# Patient Record
Sex: FEMALE | Race: BLACK OR AFRICAN AMERICAN | Employment: UNEMPLOYED
[De-identification: names, ages, dates, MRNs, and addresses within clinical notes are randomized per-mention and may not be internally consistent; named-entity substitution may affect disease eponyms.]

---

## 2017-06-03 ENCOUNTER — HEALTH MAINTENANCE LETTER (OUTPATIENT)
Age: 22
End: 2017-06-03

## 2017-07-24 ENCOUNTER — OFFICE VISIT (OUTPATIENT)
Dept: URGENT CARE | Facility: URGENT CARE | Age: 22
End: 2017-07-24
Payer: OTHER GOVERNMENT

## 2017-07-24 VITALS
HEART RATE: 62 BPM | OXYGEN SATURATION: 98 % | DIASTOLIC BLOOD PRESSURE: 80 MMHG | TEMPERATURE: 97.7 F | RESPIRATION RATE: 18 BRPM | BODY MASS INDEX: 20.93 KG/M2 | SYSTOLIC BLOOD PRESSURE: 122 MMHG | WEIGHT: 145.9 LBS

## 2017-07-24 DIAGNOSIS — J01.90 ACUTE SINUSITIS WITH SYMPTOMS > 10 DAYS: ICD-10-CM

## 2017-07-24 DIAGNOSIS — J20.9 ACUTE BRONCHITIS WITH SYMPTOMS > 10 DAYS: Primary | ICD-10-CM

## 2017-07-24 PROCEDURE — 99214 OFFICE O/P EST MOD 30 MIN: CPT | Performed by: PHYSICIAN ASSISTANT

## 2017-07-24 RX ORDER — PREDNISONE 20 MG/1
40 TABLET ORAL DAILY
Qty: 12 TABLET | Refills: 0 | Status: SHIPPED | OUTPATIENT
Start: 2017-07-24 | End: 2017-07-30

## 2017-07-24 RX ORDER — ALBUTEROL SULFATE 90 UG/1
2 AEROSOL, METERED RESPIRATORY (INHALATION) EVERY 4 HOURS PRN
Qty: 1 INHALER | Refills: 1 | Status: SHIPPED | OUTPATIENT
Start: 2017-07-24 | End: 2021-04-06

## 2017-07-24 NOTE — NURSING NOTE
"Chief Complaint   Patient presents with     Urgent Care     Nasal Congestion     Pt states x 2 weeks has had chest congestion, nose congestion, and productive cough.        Initial /80 (BP Location: Right arm, Patient Position: Chair, Cuff Size: Adult Regular)  Pulse 62  Temp 97.7  F (36.5  C) (Tympanic)  Resp 18  Wt 145 lb 14.4 oz (66.2 kg)  SpO2 98%  Breastfeeding? Yes  BMI 20.93 kg/m2 Estimated body mass index is 20.93 kg/(m^2) as calculated from the following:    Height as of 9/12/16: 5' 10\" (1.778 m).    Weight as of this encounter: 145 lb 14.4 oz (66.2 kg).  Medication Reconciliation: unable or not appropriate to perform   Nely Shetty CMA ([facility]) 7/24/2017 9:07 AM    "

## 2017-07-24 NOTE — MR AVS SNAPSHOT
After Visit Summary   7/24/2017    Lily Glez    MRN: [id number]           Patient Information     Date Of Birth          1995        Visit Information        Provider Department      7/24/2017 9:05 AM Brittaney Alvarenga PA-C [facility]        Today's Diagnoses     Acute bronchitis with symptoms > 10 days    -  1    Acute sinusitis with symptoms > 10 days           Follow-ups after your visit        Your next 10 appointments already scheduled     Jul 31, 2017  8:50 AM CDT   [facility]    [address] [place] [address]  [address]  [facility] [id number] [phone]           Bring a current list of meds and any records pertaining to this visit.  For Physicals, please bring immunization records and any forms needing to be filled out.  Please arrive 10 minutes early to complete paperwork.              Who to contact     If you have questions or need follow up information about today's clinic visit or your schedule please contact [facility] directly at [phone].  Normal or non-critical lab and imaging results will be communicated to you by Brand Embassyhart, letter or phone within 4 business days after the clinic has received the results. If you do not hear from us within 7 days, please contact the clinic through [employer] or phone. If you have a critical or abnormal lab result, we will notify you by phone as soon as possible.  Submit refill requests through [employer] or call your pharmacy and they will forward the refill request to us. Please allow 3 business days for your refill to be completed.          Additional Information About Your Visit        MyChart Information     [employer] gives you secure access to your electronic health record. If you see a primary care provider, you can also send messages to your care team and make appointments. If you have questions, please call your primary care  clinic.  If you do not have a primary care provider, please call [phone] and they will assist you.        Care EveryWhere ID     This is your Care EveryWhere ID. This could be used by other organizations to access your [place] medical records  [id number]        Your Vitals Were     Pulse Temperature Respirations Pulse Oximetry Breastfeeding? BMI (Body Mass Index)    62 97.7  F (36.5  C) (Tympanic) 18 98% Yes 20.93 kg/m2       Blood Pressure from Last 3 Encounters:   07/24/17 122/80   12/30/16 124/75   09/12/16 106/60    Weight from Last 3 Encounters:   07/24/17 145 lb 14.4 oz (66.2 kg)   09/12/16 142 lb (64.4 kg)   05/04/16 133 lb 6.4 oz (60.5 kg)              Today, you had the following     No orders found for display         Today's Medication Changes          These changes are accurate as of: 7/24/17  9:35 AM.  If you have any questions, ask your nurse or doctor.               Start taking these medicines.        Dose/Directions    albuterol 108 (90 BASE) MCG/ACT Inhaler   Commonly known as:  PROAIR HFA/PROVENTIL HFA/VENTOLIN HFA   Used for:  Acute bronchitis with symptoms > 10 days   Started by:  Brittaney Alvarenga PA-C        Dose:  2 puff   Inhale 2 puffs into the lungs every 4 hours as needed for shortness of breath / dyspnea or wheezing   Quantity:  1 Inhaler   Refills:  1       amoxicillin-clavulanate 875-125 MG per tablet   Commonly known as:  AUGMENTIN   Used for:  Acute bronchitis with symptoms > 10 days, Acute sinusitis with symptoms > 10 days   Started by:  Brittaney Alvarenga PA-C        Dose:  1 tablet   Take 1 tablet by mouth 2 times daily   Quantity:  20 tablet   Refills:  0       predniSONE 20 MG tablet   Commonly known as:  DELTASONE   Used for:  Acute bronchitis with symptoms > 10 days   Started by:  Brittaney Alvarenga PA-C        Dose:  40 mg   Take 2 tablets (40 mg) by mouth daily for 6 days   Quantity:  12 tablet   Refills:  0            Where to get your medicines       These medications were sent to [id number] - [place], [place] - [address] [place] [address]  [address], [place] [address]     Phone:  [phone]     albuterol 108 (90 BASE) MCG/ACT Inhaler    amoxicillin-clavulanate 875-125 MG per tablet    predniSONE 20 MG tablet                Primary Care Provider Office Phone # Fax #    Kimmie Bender [phone]       [facility] [address] [place] [address]        Equal Access to Services     [facility]: Hadii aad ku hadasho Soomaali, waaxda luqadaha, qaybta kaalmada adeegyada, lorenzo lomax hayzahra loving . So [facility] [phone].    ATENCIÓN: Si habla español, tiene a joe disposición servicios gratuitos de asistencia lingüística. [facility] [phone].    We comply with applicable federal civil rights laws and [place] laws. We do not discriminate on the basis of race, color, national origin, age, disability sex, sexual orientation or gender identity.            Thank you!     Thank you for choosing [facility]  for your care. Our goal is always to provide you with excellent care. Hearing back from our patients is one way we can continue to improve our services. Please take a few minutes to complete the written survey that you may receive in the mail after your visit with us. Thank you!             Your Updated Medication List - Protect others around you: Learn how to safely use, store and throw away your medicines at www.disposemymeds.org.          This list is accurate as of: 7/24/17  9:35 AM.  Always use your most recent med list.                   Brand Name Dispense Instructions for use Diagnosis    albuterol 108 (90 BASE) MCG/ACT Inhaler    PROAIR HFA/PROVENTIL HFA/VENTOLIN HFA    1 Inhaler    Inhale 2 puffs into the lungs every 4 hours as needed for shortness of breath / dyspnea or wheezing    Acute bronchitis with symptoms > 10 days        amoxicillin-clavulanate 875-125 MG per tablet    AUGMENTIN    20 tablet    Take 1 tablet by mouth 2 times daily    Acute bronchitis with symptoms > 10 days, Acute sinusitis with symptoms > 10 days       ibuprofen 800 MG tablet    ADVIL/MOTRIN    30 tablet    Take 1 tablet (800 mg) by mouth every 8 hours as needed for moderate pain        levonorgestrel-ethinyl estradiol 0.1-20 MG-MCG per tablet    AVIANE,ALESSE,LESSINA    84 tablet    Take 1 tablet by mouth daily    Oral contraception initial prescription       medroxyPROGESTERone 150 MG/ML injection    DEPO-PROVERA    3 mL    Inject 1 mL (150 mg) into the muscle every 3 months    Encounter for initial prescription of injectable contraceptive       omeprazole 20 MG CR capsule    priLOSEC    30 capsule    Take 1 capsule (20 mg) by mouth daily    Gastroesophageal reflux disease, esophagitis presence not specified       polyethylene glycol powder    MIRALAX    510 g    Take 17-34 g (1-2 capfuls) by mouth daily    Slow transit constipation       predniSONE 20 MG tablet    DELTASONE    12 tablet    Take 2 tablets (40 mg) by mouth daily for 6 days    Acute bronchitis with symptoms > 10 days       traMADol 50 MG tablet    ULTRAM    20 tablet    Take 1-2 tablets ( mg) by mouth every 6 hours as needed for moderate pain

## 2017-07-24 NOTE — PROGRESS NOTES
SUBJECTIVE:   Lily Glez is a 22 year old female presenting with a chief complaint of cough and chest congestion that is getting worse  Chest feels tight and abnormal lung sounds.  Feels slightly SOB  But no chest pain.  Hx of asthma sx when a child.  No fevers that noted.  Also with cold sx for the past 2 weeks and having increased facial pain and pressure and associated dental pain .   Onset of symptoms was 2 week(s) ago.  Course of illness is worsening.    Severity moderate  Current and Associated symptoms: none  Treatment measures tried include Fluids, OTC meds and Rest.  Predisposing factors include hx of asthma as child.  Does not have any inhalers or neb currently.  .    Past Medical History:   Diagnosis Date     DERMATITIS 2/28/03    CONTACT DERMATITIS     Unspecified asthma(493.90) 2/2/98    RAD     Current Outpatient Prescriptions   Medication Sig Dispense Refill     ibuprofen (ADVIL/MOTRIN) 800 MG tablet Take 1 tablet (800 mg) by mouth every 8 hours as needed for moderate pain (Patient not taking: Reported on 7/24/2017) 30 tablet 0     levonorgestrel-ethinyl estradiol (AVIANE,ALESSE,LESSINA) 0.1-20 MG-MCG per tablet Take 1 tablet by mouth daily (Patient not taking: Reported on 7/24/2017) 84 tablet 3     polyethylene glycol (MIRALAX) powder Take 17-34 g (1-2 capfuls) by mouth daily (Patient not taking: Reported on 7/24/2017) 510 g 4     traMADol (ULTRAM) 50 MG tablet Take 1-2 tablets ( mg) by mouth every 6 hours as needed for moderate pain (Patient not taking: Reported on 7/24/2017) 20 tablet 0     omeprazole (PRILOSEC) 20 MG capsule Take 1 capsule (20 mg) by mouth daily (Patient not taking: Reported on 7/24/2017) 30 capsule 1     medroxyPROGESTERone (DEPO-PROVERA) 150 MG/ML injection Inject 1 mL (150 mg) into the muscle every 3 months (Patient not taking: Reported on 7/24/2017) 3 mL 3     Social History   Substance Use Topics     Smoking status: Never Smoker     Smokeless tobacco: Not on file      Alcohol use No       ROS:  Review of systems negative except as stated above.    OBJECTIVE  :/80 (BP Location: Right arm, Patient Position: Chair, Cuff Size: Adult Regular)  Pulse 62  Temp 97.7  F (36.5  C) (Tympanic)  Resp 18  Wt 145 lb 14.4 oz (66.2 kg)  SpO2 98%  Breastfeeding? Yes  BMI 20.93 kg/m2  GENERAL APPEARANCE: healthy, alert and no distress  EYES: EOMI,  PERRL, conjunctiva clear  HENT: TM's normal bilaterally, rhinorrhea yellow, oral mucous membranes moist, no erythema noted and maxillary sinus tenderness bilateral  NECK: supple, nontender, no lymphadenopathy  RESP: course rhonchi throughout.  Harsh sounding cough.  Mild late expiratory wheezing noted  CV: regular rates and rhythm, normal S1 S2, no murmur noted  NEURO: Normal strength and tone, sensory exam grossly normal,  normal speech and mentation  SKIN: no suspicious lesions or rashes    Chest x-ray - offered and declines at this point. Low suspicion for pneumonia and vitals normal.  Will hold for now.      assessment/plan:  (J20.9) Acute bronchitis with symptoms > 10 days  (primary encounter diagnosis)  Comment:   Plan: amoxicillin-clavulanate (AUGMENTIN) 875-125 MG         per tablet, predniSONE (DELTASONE) 20 MG         tablet, albuterol (PROAIR HFA/PROVENTIL         HFA/VENTOLIN HFA) 108 (90 BASE) MCG/ACT Inhaler           Med as directed and OTC med for supportive cares.   Prednisone burst and Albuterol inhaler as needed.  Increased fluids . Red flag signs and to FU with PCP as needed if sx worsen or new sx develop.  Patient notes understanding and agrees with above plan    (J01.90) Acute sinusitis with symptoms > 10 days  Comment:   Plan: amoxicillin-clavulanate (AUGMENTIN) 875-125 MG         per tablet        As above and continue with increased fluids, hot packs to face and steam.

## 2017-07-31 ENCOUNTER — OFFICE VISIT (OUTPATIENT)
Dept: PEDIATRICS | Facility: CLINIC | Age: 22
End: 2017-07-31
Payer: OTHER GOVERNMENT

## 2017-07-31 VITALS
HEIGHT: 70 IN | DIASTOLIC BLOOD PRESSURE: 72 MMHG | HEART RATE: 82 BPM | WEIGHT: 148.56 LBS | OXYGEN SATURATION: 100 % | TEMPERATURE: 98.8 F | SYSTOLIC BLOOD PRESSURE: 110 MMHG | BODY MASS INDEX: 21.27 KG/M2

## 2017-07-31 DIAGNOSIS — Z11.3 ROUTINE SCREENING FOR STI (SEXUALLY TRANSMITTED INFECTION): ICD-10-CM

## 2017-07-31 DIAGNOSIS — Z12.4 CERVICAL CANCER SCREENING: Primary | ICD-10-CM

## 2017-07-31 LAB — HIV 1+2 AB+HIV1 P24 AG SERPL QL IA: NORMAL

## 2017-07-31 PROCEDURE — 87591 N.GONORRHOEAE DNA AMP PROB: CPT | Performed by: INTERNAL MEDICINE

## 2017-07-31 PROCEDURE — 86780 TREPONEMA PALLIDUM: CPT | Performed by: INTERNAL MEDICINE

## 2017-07-31 PROCEDURE — G0145 SCR C/V CYTO,THINLAYER,RESCR: HCPCS | Performed by: INTERNAL MEDICINE

## 2017-07-31 PROCEDURE — 99213 OFFICE O/P EST LOW 20 MIN: CPT | Performed by: INTERNAL MEDICINE

## 2017-07-31 PROCEDURE — 36415 COLL VENOUS BLD VENIPUNCTURE: CPT | Performed by: INTERNAL MEDICINE

## 2017-07-31 PROCEDURE — 87389 HIV-1 AG W/HIV-1&-2 AB AG IA: CPT | Performed by: INTERNAL MEDICINE

## 2017-07-31 PROCEDURE — 87491 CHLMYD TRACH DNA AMP PROBE: CPT | Performed by: INTERNAL MEDICINE

## 2017-07-31 PROCEDURE — G0124 SCREEN C/V THIN LAYER BY MD: HCPCS | Performed by: INTERNAL MEDICINE

## 2017-07-31 NOTE — LETTER
August 3, 2017      Lily Glez  [address] [address]    Dear ,      This letter is in regards to your recent cervical cancer screening (Pap smear and HPV test). Your Pap smear result was reported as LSIL - low grade squamous intraepthelial lesion. This means that there were mildly abnormal cells found in the sample that we collected from your cervix, but no cancer cells were found. The vast majority of patients with this result do not have significant cervical abnormalities.     Therefore, current guidelines recommend that you return in ONE year to repeat your Pap smear.      If you have questions about these results contact [phone].    Sincerely,      Kimmie Martini MD/Lynette Nissen RN

## 2017-07-31 NOTE — MR AVS SNAPSHOT
After Visit Summary   7/31/2017    Lily Glez    MRN: [id number]           Patient Information     Date Of Birth          1995        Visit Information        Provider Department      7/31/2017 8:50 AM [facility] Bender MD [facility]        Today's Diagnoses     Cervical cancer screening    -  1    Routine screening for STI (sexually transmitted infection)          Care Instructions    We will send your lab results to your [employer] account. Will test today for gonorrhea, chlamydia, HIV and syphilis. We will also do a pap smear.    Let me know if you every want to try birth control again. In the meantime, try to remember to use condoms all of the time to prevent pregnancy and protect from STDs.              Follow-ups after your visit        Who to contact     If you have questions or need follow up information about today's clinic visit or your schedule please contact [facility] directly at [phone].  Normal or non-critical lab and imaging results will be communicated to you by JW Playerhart, letter or phone within 4 business days after the clinic has received the results. If you do not hear from us within 7 days, please contact the clinic through [employer] [facility] [employer] or phone. If you have a critical or abnormal lab result, we will notify you by phone as soon as possible.  Submit refill requests through [employer] or call your pharmacy and they will forward the refill request to us. Please allow 3 business days for your refill to be completed.          Additional Information About Your Visit        JW Playerhart Information     [employer] gives you secure access to your electronic health record. If you see a primary care provider, you can also send messages to your care team and make appointments. If you have questions, please call your primary care clinic.  If you do not have a primary care provider, please call [phone] and they will assist you.        Care EveryWhere ID     This is your Care  "EveryWhere ID. This could be used by other organizations to access your [place] medical records  [id number]        Your Vitals Were     Pulse Temperature Height Last Period Pulse Oximetry Breastfeeding?    82 98.8  F (37.1  C) (Oral) 5' 10\" (1.778 m) 07/04/2017 (Exact Date) 100% No    BMI (Body Mass Index)                   21.32 kg/m2            Blood Pressure from Last 3 Encounters:   07/31/17 110/72   07/24/17 122/80   12/30/16 124/75    Weight from Last 3 Encounters:   07/31/17 148 lb 9 oz (67.4 kg)   07/24/17 145 lb 14.4 oz (66.2 kg)   09/12/16 142 lb (64.4 kg)              We Performed the Following     Anti Treponema     Chlamydia trachomatis PCR     HIV Antigen Antibody Combo     Neisseria gonorrhoeae PCR     Pap imaged thin layer screen only - recommended age 21 - 24 years        Primary Care Provider Office Phone # Fax #    Kimmie Bender [phone]       [facility] [address] [place] [address] [address] [place] [address]        Equal Access to Services     [facility]: Hadii aad ku hadasho Soomaali, waaxda luqadaha, qaybta kaalmada adeeileen, lorenzo loving . So [facility] [phone].    ATENCIÓN: Si habla español, tiene a joe disposición servicios gratuitos de asistencia lingüística. [facility] [phone].    We comply with applicable federal civil rights laws and [place] laws. We do not discriminate on the basis of race, color, national origin, age, disability sex, sexual orientation or gender identity.            Thank you!     [facility]  [facility]. Our goal is always to provide you with excellent care. Hearing back from our patients is one way we can continue to improve our services. Please take a few minutes to complete the written survey that you may receive in the mail after your visit with us. Thank you!             Your Updated Medication List - Protect others around you: Learn how to safely use, store and " throw away your medicines at www.disposemymeds.org.          This list is accurate as of: 7/31/17  9:07 AM.  Always use your most recent med list.                   Brand Name Dispense Instructions for use Diagnosis    albuterol 108 (90 BASE) MCG/ACT Inhaler    PROAIR HFA/PROVENTIL HFA/VENTOLIN HFA    1 Inhaler    Inhale 2 puffs into the lungs every 4 hours as needed for shortness of breath / dyspnea or wheezing    Acute bronchitis with symptoms > 10 days       amoxicillin-clavulanate 875-125 MG per tablet    AUGMENTIN    20 tablet    Take 1 tablet by mouth 2 times daily    Acute bronchitis with symptoms > 10 days, Acute sinusitis with symptoms > 10 days

## 2017-07-31 NOTE — PROGRESS NOTES
"  SUBJECTIVE:                                                    Lily Glez is a 22 year old female who presents to clinic today for the following health issues:      ED/UC Followup:    Facility:  [facility]  Date of visit: 7/24/17  Reason for visit: bronchitis   Current Status: Pt states is feeling better, cough has gone away for the most part but still has a little bit of congestion in chest. Pt has been using albuterol inhaler with relief and Augmentin.      1. STI testing: Would like STI testing done. No new exposures, sexually active with single male partner. probably history of 7 partners previously. Uses condoms some of the time. Not on birth control. No vaginal discharge or abdominal pain.     2. Bronchitis: Feeling better with Augmenting and albuterol. Cough is improving. No fevers.     Problem list and histories reviewed & adjusted, as indicated.  Additional history: as documented    Patient Active Problem List   Diagnosis     Suicidal ideation     Slow transit constipation     Esophageal reflux     No past surgical history on file.    Social History   Substance Use Topics     Smoking status: Never Smoker     Smokeless tobacco: Never Used     Alcohol use No     Family History   Problem Relation Age of Onset     Hypertension Maternal Grandmother      DIABETES Maternal Grandmother      Hypertension Maternal Grandfather              Reviewed and updated as needed this visit by clinical staffAllergies  Meds  Med Hx  Fam Hx         ROS:  Constitutional, HEENT,resp, gi and gu systems are negative, except as otherwise noted.      OBJECTIVE:   /72 (BP Location: Right arm, Patient Position: Chair, Cuff Size: Adult Regular)  Pulse 82  Temp 98.8  F (37.1  C) (Oral)  Ht 5' 10\" (1.778 m)  Wt 148 lb 9 oz (67.4 kg)  LMP 07/04/2017 (Exact Date)  SpO2 100%  Breastfeeding? No  BMI 21.32 kg/m2  Body mass index is 21.32 kg/(m^2).  GENERAL: healthy, alert and no distress   (female): normal female " external genitalia, normal urethral meatus, vaginal mucosa, normal cervix without masses or discharge    Diagnostic Test Results:  none     ASSESSMENT/PLAN:     1. Cervical cancer screening  Due for pap. Patient reports she received HPV vaccine series.   - Pap imaged thin layer screen only - recommended age 21 - 24 years    2. Routine screening for STI (sexually transmitted infection)  Will screen today. Discussed importance of condom use to prevent pregnancy, STI.   - HIV Antigen Antibody Combo  - Chlamydia trachomatis PCR  - Neisseria gonorrhoeae PCR  - Anti Treponema    Patient Instructions   We will send your lab results to your [employer] account. Will test today for gonorrhea, chlamydia, HIV and syphilis. We will also do a pap smear.    Let me know if you every want to try birth control again. In the meantime, try to remember to use condoms all of the time to prevent pregnancy and protect from STDs.          Kimmie Martini MD  [facility]

## 2017-07-31 NOTE — NURSING NOTE
"Chief Complaint   Patient presents with     STD     screening      ER F/U       Initial /72 (BP Location: Right arm, Patient Position: Chair, Cuff Size: Adult Regular)  Pulse 82  Temp 98.8  F (37.1  C) (Oral)  Ht 5' 10\" (1.778 m)  Wt 148 lb 9 oz (67.4 kg)  LMP 07/04/2017 (Exact Date)  SpO2 100%  Breastfeeding? No  BMI 21.32 kg/m2 Estimated body mass index is 21.32 kg/(m^2) as calculated from the following:    Height as of this encounter: 5' 10\" (1.778 m).    Weight as of this encounter: 148 lb 9 oz (67.4 kg).  Medication Reconciliation: complete     Tonia Headley MA 7/31/2017 8:54 AM    "

## 2017-07-31 NOTE — PATIENT INSTRUCTIONS
We will send your lab results to your [employer] account. Will test today for gonorrhea, chlamydia, HIV and syphilis. We will also do a pap smear.    Let me know if you every want to try birth control again. In the meantime, try to remember to use condoms all of the time to prevent pregnancy and protect from STDs.

## 2017-08-01 LAB
C TRACH DNA SPEC QL NAA+PROBE: NORMAL
N GONORRHOEA DNA SPEC QL NAA+PROBE: NORMAL
SPECIMEN SOURCE: NORMAL
SPECIMEN SOURCE: NORMAL

## 2017-08-02 LAB — T PALLIDUM IGG+IGM SER QL: NEGATIVE

## 2017-08-03 ENCOUNTER — RESULT FOLLOW UP (OUTPATIENT)
Dept: PEDIATRICS | Facility: CLINIC | Age: 22
End: 2017-08-03

## 2017-08-03 DIAGNOSIS — R87.612 PAPANICOLAOU SMEAR OF CERVIX WITH LOW GRADE SQUAMOUS INTRAEPITHELIAL LESION (LGSIL): ICD-10-CM

## 2017-08-03 LAB
COPATH REPORT: ABNORMAL
PAP: ABNORMAL

## 2017-08-03 NOTE — LETTER
July 17, 2018      Lily Glez  [address] [place] [id number]    Dear ,      At [facility], your health and wellness is our primary concern. That is why we are following up on an abnormal pap from 7/31/17, which was reported as LSIL. Your provider had recommended that you have a Pap smear completed by 7/31/18. Our records do not show that this has been scheduled.    It is important to complete the follow up that your provider has suggested for you to ensure that there are no worsening changes which may, over time, develop into cancer.      Please contact our office at  [phone] to schedule an appointment for a Colposcopy at your earliest convenience. If you have questions or concerns, please call the clinic and we will be happy to assist you.    If you have completed the tests outside of [facility], please have the results forwarded to our office. We will update the chart for your primary Physician to review before your next annual physical.     Thank you for choosing [facility]!    Sincerely,      Kimmie Martini MD/braulio

## 2017-08-04 NOTE — PROGRESS NOTES
07/31/17 LSIL, 22 yr old. Plan 1 yr Dx pap, due 7/2018 7/17/18 Pap reminder letter sent ([facility])  12/20/18 [facility] clinic and schedule. ([facility])  01/03/19 Patient is lost to pap tracking follow-up. FYI routed to provider. ([facility])

## 2017-08-07 ENCOUNTER — ALLIED HEALTH/NURSE VISIT (OUTPATIENT)
Dept: NURSING | Facility: CLINIC | Age: 22
End: 2017-08-07
Payer: OTHER GOVERNMENT

## 2017-08-07 DIAGNOSIS — Z23 NEED FOR HPV VACCINATION: Primary | ICD-10-CM

## 2017-08-07 PROCEDURE — 99207 ZZC NO CHARGE NURSE ONLY: CPT

## 2017-08-07 PROCEDURE — 90651 9VHPV VACCINE 2/3 DOSE IM: CPT

## 2017-08-07 PROCEDURE — 90471 IMMUNIZATION ADMIN: CPT

## 2018-09-01 ENCOUNTER — HEALTH MAINTENANCE LETTER (OUTPATIENT)
Age: 23
End: 2018-09-01

## 2018-10-17 ENCOUNTER — TRANSFERRED RECORDS (OUTPATIENT)
Dept: HEALTH INFORMATION MANAGEMENT | Facility: CLINIC | Age: 23
End: 2018-10-17

## 2018-12-20 ENCOUNTER — TELEPHONE (OUTPATIENT)
Dept: PEDIATRICS | Facility: CLINIC | Age: 23
End: 2018-12-20

## 2018-12-20 NOTE — TELEPHONE ENCOUNTER
Pt is past due for f/u pap smear.  [facility] clinic and schedule.  Mandi Peña,    Pap Tracking

## 2019-01-03 PROBLEM — R87.612 PAPANICOLAOU SMEAR OF CERVIX WITH LOW GRADE SQUAMOUS INTRAEPITHELIAL LESION (LGSIL): Status: ACTIVE | Noted: 2017-07-31

## 2019-01-03 NOTE — PROGRESS NOTES
Please call patient to schedule pap smear as she is overdue for this.    Kimmie Bender MD  Internal Medicine-Pediatrics

## 2019-01-03 NOTE — PROGRESS NOTES
1st attempt unable to leave a message at the numbers listed. Will try again at a later date.   Katie Alan on 1/3/2019 at 3:09 PM

## 2019-12-09 ENCOUNTER — HEALTH MAINTENANCE LETTER (OUTPATIENT)
Age: 24
End: 2019-12-09

## 2020-03-15 ENCOUNTER — HEALTH MAINTENANCE LETTER (OUTPATIENT)
Age: 25
End: 2020-03-15

## 2021-01-14 ENCOUNTER — HEALTH MAINTENANCE LETTER (OUTPATIENT)
Age: 26
End: 2021-01-14

## 2021-10-24 ENCOUNTER — HEALTH MAINTENANCE LETTER (OUTPATIENT)
Age: 26
End: 2021-10-24

## 2022-02-13 ENCOUNTER — HEALTH MAINTENANCE LETTER (OUTPATIENT)
Age: 27
End: 2022-02-13

## 2022-10-15 ENCOUNTER — HEALTH MAINTENANCE LETTER (OUTPATIENT)
Age: 27
End: 2022-10-15

## 2023-03-26 ENCOUNTER — HEALTH MAINTENANCE LETTER (OUTPATIENT)
Age: 28
End: 2023-03-26

## 2025-02-22 ENCOUNTER — HEALTH MAINTENANCE LETTER (OUTPATIENT)
Age: 30
End: 2025-02-22